# Patient Record
Sex: MALE | Race: WHITE | Employment: STUDENT | URBAN - METROPOLITAN AREA
[De-identification: names, ages, dates, MRNs, and addresses within clinical notes are randomized per-mention and may not be internally consistent; named-entity substitution may affect disease eponyms.]

---

## 2019-07-01 ENCOUNTER — HOSPITAL ENCOUNTER (OUTPATIENT)
Age: 19
Discharge: HOME | End: 2019-07-01
Attending: FAMILY MEDICINE
Payer: COMMERCIAL

## 2019-07-01 VITALS
RESPIRATION RATE: 20 BRPM | DIASTOLIC BLOOD PRESSURE: 71 MMHG | TEMPERATURE: 98 F | SYSTOLIC BLOOD PRESSURE: 129 MMHG | HEART RATE: 88 BPM | OXYGEN SATURATION: 99 %

## 2019-07-01 DIAGNOSIS — L73.9 FOLLICULITIS: ICD-10-CM

## 2019-07-01 DIAGNOSIS — Z20.2 EXPOSURE TO CHLAMYDIA: Primary | ICD-10-CM

## 2019-07-01 PROCEDURE — 99214 OFFICE O/P EST MOD 30 MIN: CPT

## 2019-07-01 PROCEDURE — 99213 OFFICE O/P EST LOW 20 MIN: CPT

## 2019-07-01 RX ORDER — AZITHROMYCIN 250 MG/1
1000 TABLET, FILM COATED ORAL ONCE
Status: COMPLETED | OUTPATIENT
Start: 2019-07-01 | End: 2019-07-01

## 2019-07-01 RX ORDER — MUPIROCIN CALCIUM 20 MG/G
CREAM TOPICAL
Qty: 30 G | Refills: 0 | Status: SHIPPED | OUTPATIENT
Start: 2019-07-01 | End: 2020-06-22 | Stop reason: ALTCHOICE

## 2019-07-01 NOTE — ED INITIAL ASSESSMENT (HPI)
Here to have std testing, he slept with a girl 2 weeks ago, and she has positive chlamydia, and one week ago slept with another girl, he denies penile drainage.

## 2019-07-02 NOTE — ED PROVIDER NOTES
Patient Seen in: [address] [facility]    History   Patient presents with:  Eval-G (genital)    Stated Complaint: std testing    HPI    47-year-old male presents to the immediate care today with chief complaint of  chlamydia exposure.   Patient ears  Nose: Nares normal. Septum midline. Mucosa normal. No drainage or sinus tenderness. Throat: lips, mucosa, and tongue normal; teeth and gums normal  Neck: no adenopathy and supple, symmetrical, trachea midline  Back: symmetric, no curvature.  ROM norm

## 2020-06-22 ENCOUNTER — HOSPITAL ENCOUNTER (OUTPATIENT)
Age: 20
Discharge: HOME | End: 2020-06-22
Attending: FAMILY MEDICINE
Payer: COMMERCIAL

## 2020-06-22 VITALS
HEIGHT: 72 IN | OXYGEN SATURATION: 98 % | BODY MASS INDEX: 23.7 KG/M2 | TEMPERATURE: 98 F | WEIGHT: 175 LBS | HEART RATE: 80 BPM | DIASTOLIC BLOOD PRESSURE: 89 MMHG | RESPIRATION RATE: 18 BRPM | SYSTOLIC BLOOD PRESSURE: 103 MMHG

## 2020-06-22 DIAGNOSIS — Z20.2 EXPOSURE TO CHLAMYDIA: Primary | ICD-10-CM

## 2020-06-22 DIAGNOSIS — R30.0 DYSURIA: ICD-10-CM

## 2020-06-22 PROCEDURE — 99214 OFFICE O/P EST MOD 30 MIN: CPT

## 2020-06-22 PROCEDURE — 87591 N.GONORRHOEAE DNA AMP PROB: CPT | Performed by: FAMILY MEDICINE

## 2020-06-22 PROCEDURE — 87491 CHLMYD TRACH DNA AMP PROBE: CPT | Performed by: FAMILY MEDICINE

## 2020-06-22 PROCEDURE — 99213 OFFICE O/P EST LOW 20 MIN: CPT

## 2020-06-22 PROCEDURE — 81002 URINALYSIS NONAUTO W/O SCOPE: CPT | Performed by: FAMILY MEDICINE

## 2020-06-22 RX ORDER — AZITHROMYCIN 250 MG/1
1000 TABLET, FILM COATED ORAL ONCE
Status: COMPLETED | OUTPATIENT
Start: 2020-06-22 | End: 2020-06-22

## 2020-06-22 NOTE — ED INITIAL ASSESSMENT (HPI)
Pt was notified that he had a sexual partner that tested partner that tested positive with Chlamydia.   Co pain with urination and occasional pain in testicles

## 2020-06-22 NOTE — ED PROVIDER NOTES
Patient Seen in: [address] [facility] [place]      History   Patient presents with:  Damion    Stated Complaint: TL - Jennie G    HPI  24 yo M here with complaints of pain on urination and occasional pain in the testicles.    He is concerned because he w Labs Reviewed   POCT URINALYSIS DIPSTICK - Abnormal; Notable for the following components:       Result Value    Blood, Urine Trace-Intact (*)     All other components within normal limits   CHLAMYDIA/GONOCOCCUS, ITZ   URINE CULTURE, ROUTINE     Orders